# Patient Record
Sex: MALE | URBAN - METROPOLITAN AREA
[De-identification: names, ages, dates, MRNs, and addresses within clinical notes are randomized per-mention and may not be internally consistent; named-entity substitution may affect disease eponyms.]

---

## 2022-04-15 ENCOUNTER — NURSE TRIAGE (OUTPATIENT)
Dept: PHYSICAL THERAPY | Facility: OTHER | Age: 87
End: 2022-04-15

## 2022-04-15 NOTE — TELEPHONE ENCOUNTER
Additional Information   Negative: Is this related to a work injury?  Negative: Is this related to an MVA?  Negative: Are you currently recieving homecare services? Background - Initial Assessment  Clinical complaint:Pain is from neck all the way down to low back  R>L  Denies no radiation of pain  Has numbness and tingling  Goes away when he sits down and relaxes  Has had pain worsening in the last 15 yrs  Has seen other doctors in the past did not know what specialties     Date of onset: 15yrs+  Frequency of pain: intermittent  Quality of pain: burning    Protocols used: [facility] COMPREHENSIVE SPINE PROGRAM PROTOCOL

## 2022-04-15 NOTE — TELEPHONE ENCOUNTER
Called patient and reviewed the program and the information that ORLÉANS obtained  Patient now states he had done POT and injections in the past and it did not help and does not want to do it again  Patient now refusing the triage  Patient appreciative for the call and information

## 2025-07-23 ENCOUNTER — TELEPHONE (OUTPATIENT)
Age: OVER 89
End: 2025-07-23

## 2025-07-23 NOTE — TELEPHONE ENCOUNTER
Pt's daughter was transferred to [facility] and ended up not scheduling an appt.  She was very upset that it has been 3 days that she has been reaching out to people to schedule her 89 yo dad an appt and no one is calling her back.  She stated she is not getting the warm and fuzzy's with this transition and will find another dr hernandez

## 2025-07-23 NOTE — TELEPHONE ENCOUNTER
Caller: Daughter    Doctor/Office: Dr Montalvo    Call regarding :  make a FU appointment     Call was transferred to: [facility]